# Patient Record
Sex: MALE | Race: WHITE | NOT HISPANIC OR LATINO | URBAN - METROPOLITAN AREA
[De-identification: names, ages, dates, MRNs, and addresses within clinical notes are randomized per-mention and may not be internally consistent; named-entity substitution may affect disease eponyms.]

---

## 2019-06-20 ENCOUNTER — EMERGENCY (EMERGENCY)
Facility: HOSPITAL | Age: 59
LOS: 0 days | Discharge: HOME | End: 2019-06-20
Attending: EMERGENCY MEDICINE | Admitting: EMERGENCY MEDICINE
Payer: COMMERCIAL

## 2019-06-20 VITALS
DIASTOLIC BLOOD PRESSURE: 87 MMHG | HEART RATE: 59 BPM | OXYGEN SATURATION: 99 % | RESPIRATION RATE: 18 BRPM | SYSTOLIC BLOOD PRESSURE: 144 MMHG

## 2019-06-20 VITALS
DIASTOLIC BLOOD PRESSURE: 96 MMHG | OXYGEN SATURATION: 99 % | TEMPERATURE: 96 F | SYSTOLIC BLOOD PRESSURE: 133 MMHG | RESPIRATION RATE: 18 BRPM | HEART RATE: 78 BPM

## 2019-06-20 DIAGNOSIS — R07.89 OTHER CHEST PAIN: ICD-10-CM

## 2019-06-20 DIAGNOSIS — R61 GENERALIZED HYPERHIDROSIS: ICD-10-CM

## 2019-06-20 DIAGNOSIS — R06.00 DYSPNEA, UNSPECIFIED: ICD-10-CM

## 2019-06-20 DIAGNOSIS — R07.9 CHEST PAIN, UNSPECIFIED: ICD-10-CM

## 2019-06-20 DIAGNOSIS — E78.00 PURE HYPERCHOLESTEROLEMIA, UNSPECIFIED: ICD-10-CM

## 2019-06-20 LAB
ALBUMIN SERPL ELPH-MCNC: 4.3 G/DL — SIGNIFICANT CHANGE UP (ref 3.5–5.2)
ALP SERPL-CCNC: 82 U/L — SIGNIFICANT CHANGE UP (ref 30–115)
ALT FLD-CCNC: 35 U/L — SIGNIFICANT CHANGE UP (ref 0–41)
ANION GAP SERPL CALC-SCNC: 11 MMOL/L — SIGNIFICANT CHANGE UP (ref 7–14)
APTT BLD: 35.3 SEC — SIGNIFICANT CHANGE UP (ref 27–39.2)
AST SERPL-CCNC: 48 U/L — HIGH (ref 0–41)
BASOPHILS # BLD AUTO: 0.03 K/UL — SIGNIFICANT CHANGE UP (ref 0–0.2)
BASOPHILS NFR BLD AUTO: 0.7 % — SIGNIFICANT CHANGE UP (ref 0–1)
BILIRUB SERPL-MCNC: 0.7 MG/DL — SIGNIFICANT CHANGE UP (ref 0.2–1.2)
BLD GP AB SCN SERPL QL: SIGNIFICANT CHANGE UP
BUN SERPL-MCNC: 19 MG/DL — SIGNIFICANT CHANGE UP (ref 10–20)
CALCIUM SERPL-MCNC: 9.7 MG/DL — SIGNIFICANT CHANGE UP (ref 8.5–10.1)
CHLORIDE SERPL-SCNC: 104 MMOL/L — SIGNIFICANT CHANGE UP (ref 98–110)
CO2 SERPL-SCNC: 23 MMOL/L — SIGNIFICANT CHANGE UP (ref 17–32)
CREAT SERPL-MCNC: 1 MG/DL — SIGNIFICANT CHANGE UP (ref 0.7–1.5)
EOSINOPHIL # BLD AUTO: 0.06 K/UL — SIGNIFICANT CHANGE UP (ref 0–0.7)
EOSINOPHIL NFR BLD AUTO: 1.4 % — SIGNIFICANT CHANGE UP (ref 0–8)
GLUCOSE SERPL-MCNC: 107 MG/DL — HIGH (ref 70–99)
HCT VFR BLD CALC: 42 % — SIGNIFICANT CHANGE UP (ref 42–52)
HGB BLD-MCNC: 15.3 G/DL — SIGNIFICANT CHANGE UP (ref 14–18)
IMM GRANULOCYTES NFR BLD AUTO: 0.2 % — SIGNIFICANT CHANGE UP (ref 0.1–0.3)
INR BLD: 1.08 RATIO — SIGNIFICANT CHANGE UP (ref 0.65–1.3)
LYMPHOCYTES # BLD AUTO: 1.43 K/UL — SIGNIFICANT CHANGE UP (ref 1.2–3.4)
LYMPHOCYTES # BLD AUTO: 33.3 % — SIGNIFICANT CHANGE UP (ref 20.5–51.1)
MCHC RBC-ENTMCNC: 31 PG — SIGNIFICANT CHANGE UP (ref 27–31)
MCHC RBC-ENTMCNC: 36.4 G/DL — SIGNIFICANT CHANGE UP (ref 32–37)
MCV RBC AUTO: 85.2 FL — SIGNIFICANT CHANGE UP (ref 80–94)
MONOCYTES # BLD AUTO: 0.33 K/UL — SIGNIFICANT CHANGE UP (ref 0.1–0.6)
MONOCYTES NFR BLD AUTO: 7.7 % — SIGNIFICANT CHANGE UP (ref 1.7–9.3)
NEUTROPHILS # BLD AUTO: 2.43 K/UL — SIGNIFICANT CHANGE UP (ref 1.4–6.5)
NEUTROPHILS NFR BLD AUTO: 56.7 % — SIGNIFICANT CHANGE UP (ref 42.2–75.2)
NRBC # BLD: 0 /100 WBCS — SIGNIFICANT CHANGE UP (ref 0–0)
NT-PROBNP SERPL-SCNC: 6 PG/ML — SIGNIFICANT CHANGE UP (ref 0–300)
PLATELET # BLD AUTO: 192 K/UL — SIGNIFICANT CHANGE UP (ref 130–400)
POTASSIUM SERPL-MCNC: 4.5 MMOL/L — SIGNIFICANT CHANGE UP (ref 3.5–5)
POTASSIUM SERPL-SCNC: 4.5 MMOL/L — SIGNIFICANT CHANGE UP (ref 3.5–5)
PROT SERPL-MCNC: 7.1 G/DL — SIGNIFICANT CHANGE UP (ref 6–8)
PROTHROM AB SERPL-ACNC: 12.4 SEC — SIGNIFICANT CHANGE UP (ref 9.95–12.87)
RBC # BLD: 4.93 M/UL — SIGNIFICANT CHANGE UP (ref 4.7–6.1)
RBC # FLD: 11.9 % — SIGNIFICANT CHANGE UP (ref 11.5–14.5)
SODIUM SERPL-SCNC: 138 MMOL/L — SIGNIFICANT CHANGE UP (ref 135–146)
TROPONIN T SERPL-MCNC: <0.01 NG/ML — SIGNIFICANT CHANGE UP
WBC # BLD: 4.29 K/UL — LOW (ref 4.8–10.8)
WBC # FLD AUTO: 4.29 K/UL — LOW (ref 4.8–10.8)

## 2019-06-20 PROCEDURE — 99236 HOSP IP/OBS SAME DATE HI 85: CPT

## 2019-06-20 PROCEDURE — 71046 X-RAY EXAM CHEST 2 VIEWS: CPT | Mod: 26

## 2019-06-20 PROCEDURE — 93010 ELECTROCARDIOGRAM REPORT: CPT | Mod: 76

## 2019-06-20 PROCEDURE — 75574 CT ANGIO HRT W/3D IMAGE: CPT | Mod: 26,59

## 2019-06-20 RX ORDER — ASPIRIN/CALCIUM CARB/MAGNESIUM 324 MG
1 TABLET ORAL
Qty: 30 | Refills: 0
Start: 2019-06-20 | End: 2019-07-19

## 2019-06-20 RX ORDER — ATORVASTATIN CALCIUM 80 MG/1
1 TABLET, FILM COATED ORAL
Qty: 30 | Refills: 0
Start: 2019-06-20 | End: 2019-07-19

## 2019-06-20 RX ORDER — METOPROLOL TARTRATE 50 MG
1 TABLET ORAL
Qty: 60 | Refills: 0
Start: 2019-06-20 | End: 2019-07-19

## 2019-06-20 RX ORDER — NITROGLYCERIN 6.5 MG
0.4 CAPSULE, EXTENDED RELEASE ORAL ONCE
Refills: 0 | Status: COMPLETED | OUTPATIENT
Start: 2019-06-20 | End: 2019-06-20

## 2019-06-20 RX ORDER — ASPIRIN/CALCIUM CARB/MAGNESIUM 324 MG
243 TABLET ORAL ONCE
Refills: 0 | Status: COMPLETED | OUTPATIENT
Start: 2019-06-20 | End: 2019-06-20

## 2019-06-20 RX ADMIN — Medication 0.4 MILLIGRAM(S): at 11:42

## 2019-06-20 RX ADMIN — Medication 243 MILLIGRAM(S): at 11:42

## 2019-06-20 NOTE — ED CDU PROVIDER INITIAL DAY NOTE - PHYSICAL EXAMINATION
CONST: Well appearing in NAD  CARD: Normal S1 S2; Normal rate and rhythm  RESP: Equal BS B/L, No wheezes, rhonchi or rales. No distress  GI: Soft, non-tender, non-distended.  MS: Normal ROM in all extremities. No midline spinal tenderness.  SKIN: Warm, dry, no acute rashes. Good turgor  NEURO: A&Ox3, No focal deficits. Strength 5/5 with no sensory deficits. Steady gait

## 2019-06-20 NOTE — CONSULT NOTE ADULT - SUBJECTIVE AND OBJECTIVE BOX
CHIEF COMPLAINT:Patient is a 58y old  Male who presents with a chief complaint of chest pain    HISTORY OF PRESENT ILLNESS:  58y old male presented to the ER with complains of chest pain. The pain began 1 ago, and since then has occurred two times. The pain is retrosternal in location, is described as pressure like, moderate intensity, and does not radiate. The pain occurs at rest, lasts approximately 1-2 hours, and resolves upon rest. Pain is worse upon exertion.  The chest pain is not associated with palpitations, shortness of breath, PABON, diaphoresis, lightheadedness.    PAST MEDICAL & SURGICAL HISTORY:  No pertinent past medical history  No significant past surgical history    FAMILY HISTORY:    Allergies    No Known Allergies    Intolerances    	  Home Medications:    MEDICATIONS  (STANDING):    MEDICATIONS  (PRN):        SOCIAL HISTORY:    [  ] active smoker  [ x ] non smoker  [  ] Etoh  [  ] recreational drugs    REVIEW OF SYSTEMS:  14 point ROS negative except as mentioned above in HPI    PHYSICAL EXAM:  T(C): 35.7 (06-20-19 @ 15:14), Max: 35.7 (06-20-19 @ 09:41)  HR: 59 (06-20-19 @ 19:45) (59 - 78)  BP: 144/87 (06-20-19 @ 19:45) (129/82 - 144/87)  RR: 18 (06-20-19 @ 19:45) (18 - 18)  SpO2: 99% (06-20-19 @ 19:45) (99% - 99%)  Wt(kg): --  I&O's Summary      General Appearance: in NAD	  HEENT: NC, No JVD appreciated  Cardiovascular: Normal S1 S2, No murmurs  Respiratory: cta b/l  Gastrointestinal:  Soft, Non-tender, BS +	  Neurologic: No focal deficits, AAOx3  Extremities: ROM wnl, No clubbing/cyanosis/edema  Skin: No rashes, No ecchymoses, No cyanosis  Vascular: Peripheral pulses palpable 2+ bilaterally    LABS:	 	                        15.3   4.29  )-----------( 192      ( 20 Jun 2019 11:15 )             42.0     06-20    138  |  104  |  19  ----------------------------<  107<H>  4.5   |  23  |  1.0    Ca    9.7      20 Jun 2019 11:15    TPro  7.1  /  Alb  4.3  /  TBili  0.7  /  DBili  x   /  AST  48<H>  /  ALT  35  /  AlkPhos  82  06-20    eGFR if Non African American: 84 mL/min/1.73M2 (06-20-19 @ 11:15)        Serum Pro-Brain Natriuretic Peptide: 6 pg/mL (06-20-19 @ 12:00)      CARDIAC MARKERS:  06-20-19 @ 11:15  TROPONIN-T  <0.01 ng/mL  CKMB  --  CREATINE KINASE  --  	    ECG:  < from: 12 Lead ECG (06.20.19 @ 12:15) >  Diagnosis Line Sinus bradycardia  Poor R wave progression  Abnormal ECG    Confirmed by Black Hassan ([id number]) on 6/20/2019 2:07:17 PM    < end of copied text >  	    PREVIOUS DIAGNOSTIC TESTING:      < from: CT Heart with Coronaries (06.20.19 @ 14:29) >  IMPRESSION:    1.  Mixed predominantly calcified plaque in the proximal to mid LAD, up   to focal 40-45 % luminal narrowing in the mid LAD.    2. Calcified plaque at the distal left main coronary artery, and ostium   of the left circumflex artery, with minimal luminal irregularity.    3. Small noncalcified plaque at the origin of the first obtuse marginal   branch with mild luminal narrowing.    2. Total coronary calcium score is 145. For a 58-year-old man, this   corresponds to 82 percentile.     CAD RAD: 2      CAD-RADS  Reporting and data system   ______________________________________________________________________    CAD-RADS 0  -    No plaque or stenosis                 Documented absence   of CAD  CAD-RADS 1  -    1-24% Minimal stenosis              Minimal   non-obstructive CAD  CAD-RADS 2  -    25 - 49% Mild stenosis                Mild   non-obstructive CAD  CAD-RADS 3  -    50 - 69% stenosis                        Moderate   Stenosis   CAD-RADS 4  -    A. 70 - 99%Stenosis  or             Severe Stenosis                                       B. Left main >50%   CAD-RADS 5  -    100% (total occlusion)                 Total coronary   occlusion   CAD-RADS N  -    Non-diagnostic study                   Obstructive CAD   cannot be excluded  ______________________________________________________________________          LAURA HERNANDES M.D., ATTENDING RADIOLOGIST  This document has been electronically signed. Jun 20 2019  3:25PM    < end of copied text >    [ ] Stress Test:

## 2019-06-20 NOTE — ED CDU PROVIDER INITIAL DAY NOTE - ATTENDING CONTRIBUTION TO CARE
57yo an h/o HLD was placed in CDU for workup of chest pain, exertional, associated with diaphoresis over the past couple of days, relieved with NTG in the ED. Workup in the ED was unremarkable with EKG, labs including cardiac enzymes, CXR. Plan is for serial EKG and enzymes, CCTA, reassess.

## 2019-06-20 NOTE — ED PROVIDER NOTE - NS ED ROS FT
Constitutional: No fever, chills.  Eyes:  No visual changes, eye pain or discharge.  ENMT:  No hearing changes, pain, no sore throat or runny nose, no difficulty swallowing  Cardiac: Substernal CP. PABON. No edema. No chest pain with exertion.  Respiratory:  No cough or respiratory distress. No hemoptysis. No history of asthma or RAD.  GI:  No nausea, vomiting, diarrhea or abdominal pain.  :  No dysuria, frequency or burning.  MS:  No myalgia, muscle weakness, joint pain or back pain.  Neuro:  No headache or weakness.  No LOC.  Skin:  No skin rash.   Endocrine: No history of thyroid disease or diabetes.

## 2019-06-20 NOTE — ED ADULT NURSE NOTE - OBJECTIVE STATEMENT
Patient stated while doing some pluming work yesterday at 12 pm he suddenly felt like someone was stepping on his chest, this pressure continued and is present currently. Sensation worse with activity and associated with SOB, as per patient symptoms improved slightly after taking 81 mg baby aspirin. Patient denies Fevers chills abdominal pain and urinary symptoms

## 2019-06-20 NOTE — ED CDU PROVIDER DISPOSITION NOTE - CLINICAL COURSE
p 57yo an h/o HLD was placed in CDU for workup of chest pain, exertional, associated with diaphoresis over the past couple of days, relieved with NTG in the ED. Workup in the ED was unremarkable with EKG, labs including cardiac enzymes, CXR. Pt was monitored and remained asymptomatic, VS, exam as noted. CCTA revealed CAD-RAD score of 2 with up to 45-50 stenosis noted; cardiology was consulted, rec asa and statin, f/u with Dr Fuentes in office as if sx persist next step would be cardiac cath.

## 2019-06-20 NOTE — ED CDU PROVIDER DISPOSITION NOTE - CARE PROVIDER_API CALL
Felicia Cabezas)  Cardiovascular Disease; Internal Medicine; Interventional Cardiology  [address] [place] [address]  Phone: [phone]  Fax: [phone]  Follow Up Time: 4-6 Days

## 2019-06-20 NOTE — ED ADULT NURSE REASSESSMENT NOTE - NS ED NURSE REASSESS COMMENT FT1
pt received pt A&Ox4 in no acute distress, vss, pt denies pain or discomfort at this time. Ongoing cardiac monitoring maintained. pt updated with the plan of care. safety and comfort measures maintained. will continue with current plan of care,

## 2019-06-20 NOTE — ED CDU PROVIDER INITIAL DAY NOTE - PROGRESS NOTE DETAILS
CCTA resulted 40-45% stenosis, likely medical management, however given symptoms and percentage of stenosis, borderline for cardio consult. As patient does not have an outpatient cardiologist, will obtain consult in ED to ensure f/u. Patient seen by the cards fellow Dr. Meyer, observe overnight, if CP free, can be discharged in the AM. Recommended optimizing medical management Lipitor, Beta Blocker, ASA received signout from Anil Juarez; pt completed ccta - cardiology fellow recommends medical management but pt pending attending cardiologist DR. Cutler to Ok d/c; pt seen by DR. Cutler cardiology; cleared for d/c home; will plan for outpt eval if need; recommend asa,lipitor, no beta-blocker;

## 2019-06-20 NOTE — ED CDU PROVIDER DISPOSITION NOTE - ATTENDING CONTRIBUTION TO CARE
57yo an h/o HLD was placed in CDU for workup of chest pain, exertional, associated with diaphoresis over the past couple of days, relieved with NTG in the ED. Workup in the ED was unremarkable with EKG, labs including cardiac enzymes, CXR. Pt was monitored and remained asymptomatic, VS, exam as noted. CCTA revealed CAD-RAD score of 2 with up to 45-50 stenosis noted; cardiology was consulted, rec asa and statin, f/u with Dr Fuentes in office as if sx persist next step would be cardiac cath.

## 2019-06-20 NOTE — ED PROVIDER NOTE - OBJECTIVE STATEMENT
58M nosigPMH p/w CP. Started yesterday while performing routine housework. Pressure-like, non-radiating. Worse with exertion, a/w PABON. Pt vomited once yesterday. Denies fever, chills, palpitations, abdominal pain, diarrhea, dysuria. Never had this pain before. Never had stress test, cardiac cath. No 1st degree relatives of early cardiac disease. Never smoker.

## 2019-06-20 NOTE — ED ADULT NURSE NOTE - NSIMPLEMENTINTERV_GEN_ALL_ED
Implemented All Universal Safety Interventions:  [place] to call system. Call bell, personal items and telephone within reach. Instruct patient to call for assistance. Room bathroom lighting operational. Non-slip footwear when patient is off stretcher. Physically safe environment: no spills, clutter or unnecessary equipment. Stretcher in lowest position, wheels locked, appropriate side rails in place.

## 2019-06-20 NOTE — ED CDU PROVIDER INITIAL DAY NOTE - NS ED ROS FT
CONST: No fever, chills or bodyaches  CARD: see HPI  RESP: No SOB, cough  GI: No abdominal pain, N/V/D  MS: No joint pain, back pain or extremity pain/injury  SKIN: No rashes  NEURO: No headache, dizziness, paresthesias or LOC

## 2019-06-20 NOTE — ED PROVIDER NOTE - ATTENDING CONTRIBUTION TO CARE
58 y.o. male, PMH fo HLD, c/o chest pressure since yesterday, associated with 1 episode of vomiting. Pain is non-radiating. Took ASA last night but when woke up today continued to have CP, prompting ED visit. The pain occurs at rest, lasts approximately 1-2 hours, and resolves upon rest. Pain is worse upon exertion. On exam, pt in NAD, AAOx3, head NC/AT, CN II-XII intact, lungs CTA B/L, CV S1S2 regular, abdomen soft/NT/ND/(+)BS, ext (-) edema, motor 5/5x4, sensation intact. Will do labs, EKG, CXR, give ASA and transfer to obs.

## 2019-06-20 NOTE — ED PROVIDER NOTE - PHYSICAL EXAMINATION
Constitutional: Well developed, well nourished. NAD. Good general hygiene  Head: Atraumatic.  Eyes: PERRLA. EOMI without discomfort.   ENT: No nasal discharge. Mucous membranes moist.  Neck: Supple. Painless ROM.  Cardiovascular: Regular rhythm. Regular rate. Normal S1 and S2. No murmurs. 2+ pulses in all extremities.   Pulmonary: Normal respiratory rate and effort. Lungs clear to auscultation bilaterally. No wheezing, rales, or rhonchi. Bilateral, equal lung expansion.   Abdominal: Soft. Nondistended. Nontender. No rebound or guarding.   Extremities. Pelvis stable. No lower extremity edema. Symmetric calves.  Skin: No rashes.   Vascular: 2+ pulses in b/l arms and distal legs. <2sec capillary refill.  Neuro: AAOx3. No focal neurological deficits.  Psych: Normal mood. Normal affect.

## 2019-06-20 NOTE — ED CDU PROVIDER INITIAL DAY NOTE - OBJECTIVE STATEMENT
57yo male with PMHx high cholesterol presented to ED c/o mid-sternal chest pressure persistent yesterday, without radiation, without any aggravating factors, mildly relieved by ASA, associated with diaphoresis. Chest pressure fully resolved with NTG given in ED. Patient has not had a cardio workup in past. No smoking hx. Negative FHx CAD. 57yo male with PMHx high cholesterol presented to ED c/o mid-sternal chest pressure persistent yesterday, first noticed while "doing housework", without radiation, without any aggravating factors, mildly relieved by ASA, associated with diaphoresis. Chest pressure fully resolved with NTG given in ED. Patient has not had a cardio workup in past. No smoking hx. Negative FHx CAD.

## 2019-06-20 NOTE — ED PROVIDER NOTE - PROGRESS NOTE DETAILS
58M HLD p/w CP since yday. pressure like, nonradiating, vomited once. Took baby asa this morning and yday. EKG nonischemic. pending labs, xr. Endorsed to [facility] Lo for obs. CT coronaries.

## 2021-04-12 ENCOUNTER — INPATIENT (INPATIENT)
Facility: HOSPITAL | Age: 61
LOS: 1 days | Discharge: HOME | End: 2021-04-14
Attending: INTERNAL MEDICINE | Admitting: INTERNAL MEDICINE
Payer: COMMERCIAL

## 2021-04-12 VITALS
SYSTOLIC BLOOD PRESSURE: 162 MMHG | TEMPERATURE: 97 F | RESPIRATION RATE: 16 BRPM | WEIGHT: 210.1 LBS | OXYGEN SATURATION: 97 % | DIASTOLIC BLOOD PRESSURE: 94 MMHG | HEIGHT: 68 IN | HEART RATE: 71 BPM

## 2021-04-12 LAB
ALBUMIN SERPL ELPH-MCNC: 4.9 G/DL — SIGNIFICANT CHANGE UP (ref 3.5–5.2)
ALP SERPL-CCNC: 98 U/L — SIGNIFICANT CHANGE UP (ref 30–115)
ALT FLD-CCNC: 28 U/L — SIGNIFICANT CHANGE UP (ref 0–41)
ANION GAP SERPL CALC-SCNC: 12 MMOL/L — SIGNIFICANT CHANGE UP (ref 7–14)
APTT BLD: 34.4 SEC — SIGNIFICANT CHANGE UP (ref 27–39.2)
AST SERPL-CCNC: 44 U/L — HIGH (ref 0–41)
BASOPHILS # BLD AUTO: 0.03 K/UL — SIGNIFICANT CHANGE UP (ref 0–0.2)
BASOPHILS NFR BLD AUTO: 0.4 % — SIGNIFICANT CHANGE UP (ref 0–1)
BILIRUB SERPL-MCNC: 1.1 MG/DL — SIGNIFICANT CHANGE UP (ref 0.2–1.2)
BUN SERPL-MCNC: 12 MG/DL — SIGNIFICANT CHANGE UP (ref 10–20)
CALCIUM SERPL-MCNC: 10 MG/DL — SIGNIFICANT CHANGE UP (ref 8.5–10.1)
CHLORIDE SERPL-SCNC: 102 MMOL/L — SIGNIFICANT CHANGE UP (ref 98–110)
CO2 SERPL-SCNC: 26 MMOL/L — SIGNIFICANT CHANGE UP (ref 17–32)
CREAT SERPL-MCNC: 1 MG/DL — SIGNIFICANT CHANGE UP (ref 0.7–1.5)
EOSINOPHIL # BLD AUTO: 0.12 K/UL — SIGNIFICANT CHANGE UP (ref 0–0.7)
EOSINOPHIL NFR BLD AUTO: 1.7 % — SIGNIFICANT CHANGE UP (ref 0–8)
GLUCOSE SERPL-MCNC: 87 MG/DL — SIGNIFICANT CHANGE UP (ref 70–99)
HCT VFR BLD CALC: 43.4 % — SIGNIFICANT CHANGE UP (ref 42–52)
HGB BLD-MCNC: 15.3 G/DL — SIGNIFICANT CHANGE UP (ref 14–18)
IMM GRANULOCYTES NFR BLD AUTO: 0.3 % — SIGNIFICANT CHANGE UP (ref 0.1–0.3)
INR BLD: 1.11 RATIO — SIGNIFICANT CHANGE UP (ref 0.65–1.3)
LYMPHOCYTES # BLD AUTO: 2.04 K/UL — SIGNIFICANT CHANGE UP (ref 1.2–3.4)
LYMPHOCYTES # BLD AUTO: 28.9 % — SIGNIFICANT CHANGE UP (ref 20.5–51.1)
MCHC RBC-ENTMCNC: 30.6 PG — SIGNIFICANT CHANGE UP (ref 27–31)
MCHC RBC-ENTMCNC: 35.3 G/DL — SIGNIFICANT CHANGE UP (ref 32–37)
MCV RBC AUTO: 86.8 FL — SIGNIFICANT CHANGE UP (ref 80–94)
MONOCYTES # BLD AUTO: 0.4 K/UL — SIGNIFICANT CHANGE UP (ref 0.1–0.6)
MONOCYTES NFR BLD AUTO: 5.7 % — SIGNIFICANT CHANGE UP (ref 1.7–9.3)
NEUTROPHILS # BLD AUTO: 4.44 K/UL — SIGNIFICANT CHANGE UP (ref 1.4–6.5)
NEUTROPHILS NFR BLD AUTO: 63 % — SIGNIFICANT CHANGE UP (ref 42.2–75.2)
NRBC # BLD: 0 /100 WBCS — SIGNIFICANT CHANGE UP (ref 0–0)
NT-PROBNP SERPL-SCNC: 82 PG/ML — SIGNIFICANT CHANGE UP (ref 0–300)
PLATELET # BLD AUTO: 180 K/UL — SIGNIFICANT CHANGE UP (ref 130–400)
POTASSIUM SERPL-MCNC: 4.1 MMOL/L — SIGNIFICANT CHANGE UP (ref 3.5–5)
POTASSIUM SERPL-SCNC: 4.1 MMOL/L — SIGNIFICANT CHANGE UP (ref 3.5–5)
PROT SERPL-MCNC: 7.4 G/DL — SIGNIFICANT CHANGE UP (ref 6–8)
PROTHROM AB SERPL-ACNC: 12.8 SEC — SIGNIFICANT CHANGE UP (ref 9.95–12.87)
RBC # BLD: 5 M/UL — SIGNIFICANT CHANGE UP (ref 4.7–6.1)
RBC # FLD: 12.3 % — SIGNIFICANT CHANGE UP (ref 11.5–14.5)
SODIUM SERPL-SCNC: 140 MMOL/L — SIGNIFICANT CHANGE UP (ref 135–146)
TROPONIN T SERPL-MCNC: <0.01 NG/ML — SIGNIFICANT CHANGE UP
WBC # BLD: 7.05 K/UL — SIGNIFICANT CHANGE UP (ref 4.8–10.8)
WBC # FLD AUTO: 7.05 K/UL — SIGNIFICANT CHANGE UP (ref 4.8–10.8)

## 2021-04-12 PROCEDURE — 93010 ELECTROCARDIOGRAM REPORT: CPT

## 2021-04-12 PROCEDURE — 71046 X-RAY EXAM CHEST 2 VIEWS: CPT | Mod: 26

## 2021-04-12 PROCEDURE — 99218: CPT

## 2021-04-12 RX ORDER — ASPIRIN/CALCIUM CARB/MAGNESIUM 324 MG
325 TABLET ORAL ONCE
Refills: 0 | Status: COMPLETED | OUTPATIENT
Start: 2021-04-12 | End: 2021-04-12

## 2021-04-12 RX ADMIN — Medication 325 MILLIGRAM(S): at 22:49

## 2021-04-12 NOTE — ED PROVIDER NOTE - PROGRESS NOTE DETAILS
TC: 61 yo M with PMHx of anxiety who presents with nonpleuritic nonexertional cp x3-4 days. Had CCTA 2 yrs ago with 40-45% prox/mid LAD stenosis and CAD RADS 2. Here in ED, vitals wnl. Unremarkable physical exam. Ordered labs, ekg, cxr. Will reassess. TC: Labs wnl including negative trop. Ekg nonischemic. Cxr unremarkable. HEART score 2 (1+ age, 1+ risk factors). Given ASA 325mg. Will place in obs for further workup.

## 2021-04-12 NOTE — ED PROVIDER NOTE - OBJECTIVE STATEMENT
61 yo M with PMHx of anxiety who presents with nonradiating, waxing/waning, moderate, midsternal cp x3-4 days associated with dizziness. No alleviating/aggravating factors. No fever, chills, sob, palpitations, leg swelling/pain. Sx feel worse than when he was seen in ED 2 yrs ago and had CCTA 6/20/19 showing 40-45% prox/mid LAD stenosis, calcium score 145, CAD RADS 2. Had f/u stress test that was reportedly normal.    Cardio: Dr. Cabezas 59 yo M with PMHx of anxiety who presents with nonradiating, waxing/waning, moderate, midsternal cp x3-4 days associated with dizziness. No alleviating/aggravating factors. No fever, chills, sob, palpitations, leg swelling/pain. Sx feel worse than when he was seen in ED 2 yrs ago and had CCTA 6/20/19 showing 40-45% prox/mid LAD stenosis, calcium score 145, CAD RADS 2. Had f/u stress test that was reportedly normal. No FHx of CAD.    Cardio: Dr. Cabezas 61 yo M with PMHx of anxiety who presents with nonradiating, waxing/waning, moderate, midsternal cp x3-4 days associated with dizziness. No alleviating/aggravating factors. No fever, chills, sob, palpitations, leg swelling/pain. Sx feel worse than when he was seen in ED 2 yrs ago and had CCTA 6/20/19 showing 40-45% prox/mid LAD stenosis, calcium score 145, CAD RADS 2. Had f/u stress test that was reportedly normal but did not take statins as instructed. No FHx of CAD.    Cardio: Dr. Cabezas

## 2021-04-12 NOTE — ED PROVIDER NOTE - PHYSICAL EXAMINATION
CONSTITUTIONAL: well developed, nontoxic appearing, in no acute distress, speaking in full sentences  SKIN: warm, dry, no rash, cap refill < 2 seconds  HEENT: normocephalic, atraumatic, no conjunctival erythema, moist mucous membranes, patent airway  NECK: supple  CV:  regular rate, regular rhythm, 2+ radial pulses bilaterally  RESP: no wheezes, no rales, no rhonchi, normal work of breathing  ABD: soft, nontender, nondistended, no rebound, no guarding  MSK: normal ROM, no cyanosis, no edema, no calf tenderness  NEURO: alert, oriented, grossly unremarkable  PSYCH: cooperative, appropriate

## 2021-04-12 NOTE — ED ADULT TRIAGE NOTE - CHIEF COMPLAINT QUOTE
"I've been having chest pain since last night. I had a CT done years ago that said I had a blockage in the ."

## 2021-04-12 NOTE — ED PROVIDER NOTE - NS ED ROS FT
GEN:  no fever, no chills, no generalized weakness  NEURO:  no headache, + dizziness  ENT: no sore throat, no runny nose  CV:  + chest pain, no palpitations  RESP:  no sob, no cough  GI:  no nausea, no vomiting, no abdominal pain, no diarrhea  :  no dysuria, no urinary frequency, no hematuria  MSK:  no joint pain, no edema  SKIN:  no rash, no bruising  HEME: no hematochezia, no melena

## 2021-04-12 NOTE — ED PROVIDER NOTE - CLINICAL SUMMARY MEDICAL DECISION MAKING FREE TEXT BOX
61 yo M, hx of anxiety, previous CCTA with CADs-RADs of 2 due to 40-45% LAD stenosis, followed by "normal" stress tet by Dr. Hansen, here for assessment of chst pain. Pain L sided non radiating, associated with dizziness but no associated dyspnea, nausea, diaphoresis.    EKG non ischemic, exam unremarkable. Trop negative x 1.    Given hx of abnormal CCTA, description of pain, will need obs placement for serial trops, tele monitoring and consideration of additional testing/cards eval.

## 2021-04-13 LAB
A1C WITH ESTIMATED AVERAGE GLUCOSE RESULT: 4.9 % — SIGNIFICANT CHANGE UP (ref 4–5.6)
CHOLEST SERPL-MCNC: 228 MG/DL — HIGH
ESTIMATED AVERAGE GLUCOSE: 94 MG/DL — SIGNIFICANT CHANGE UP (ref 68–114)
HDLC SERPL-MCNC: 63 MG/DL — SIGNIFICANT CHANGE UP
LIPID PNL WITH DIRECT LDL SERPL: 162 MG/DL — HIGH
NON HDL CHOLESTEROL: 165 MG/DL — HIGH
RAPID RVP RESULT: SIGNIFICANT CHANGE UP
SARS-COV-2 RNA SPEC QL NAA+PROBE: SIGNIFICANT CHANGE UP
TRIGL SERPL-MCNC: 104 MG/DL — SIGNIFICANT CHANGE UP
TROPONIN T SERPL-MCNC: <0.01 NG/ML — SIGNIFICANT CHANGE UP
TROPONIN T SERPL-MCNC: <0.01 NG/ML — SIGNIFICANT CHANGE UP

## 2021-04-13 PROCEDURE — 93010 ELECTROCARDIOGRAM REPORT: CPT

## 2021-04-13 PROCEDURE — 99217: CPT

## 2021-04-13 PROCEDURE — 93010 ELECTROCARDIOGRAM REPORT: CPT | Mod: 77

## 2021-04-13 RX ORDER — SODIUM CHLORIDE 9 MG/ML
1000 INJECTION INTRAMUSCULAR; INTRAVENOUS; SUBCUTANEOUS
Refills: 0 | Status: DISCONTINUED | OUTPATIENT
Start: 2021-04-13 | End: 2021-04-14

## 2021-04-13 RX ORDER — ATORVASTATIN CALCIUM 80 MG/1
20 TABLET, FILM COATED ORAL AT BEDTIME
Refills: 0 | Status: DISCONTINUED | OUTPATIENT
Start: 2021-04-13 | End: 2021-04-14

## 2021-04-13 RX ORDER — ASPIRIN/CALCIUM CARB/MAGNESIUM 324 MG
81 TABLET ORAL DAILY
Refills: 0 | Status: DISCONTINUED | OUTPATIENT
Start: 2021-04-13 | End: 2021-04-14

## 2021-04-13 RX ORDER — SODIUM CHLORIDE 9 MG/ML
450 INJECTION INTRAMUSCULAR; INTRAVENOUS; SUBCUTANEOUS
Refills: 0 | Status: DISCONTINUED | OUTPATIENT
Start: 2021-04-13 | End: 2021-04-14

## 2021-04-13 RX ORDER — ATROPINE SULFATE 0.1 MG/ML
1 SYRINGE (ML) INJECTION ONCE
Refills: 0 | Status: COMPLETED | OUTPATIENT
Start: 2021-04-13 | End: 2021-04-13

## 2021-04-13 RX ORDER — MORPHINE SULFATE 50 MG/1
4 CAPSULE, EXTENDED RELEASE ORAL ONCE
Refills: 0 | Status: DISCONTINUED | OUTPATIENT
Start: 2021-04-13 | End: 2021-04-13

## 2021-04-13 RX ORDER — PHENYLEPHRINE HYDROCHLORIDE 10 MG/ML
100 INJECTION INTRAVENOUS ONCE
Refills: 0 | Status: COMPLETED | OUTPATIENT
Start: 2021-04-13 | End: 2021-04-13

## 2021-04-13 RX ORDER — NITROGLYCERIN 6.5 MG
0.4 CAPSULE, EXTENDED RELEASE ORAL ONCE
Refills: 0 | Status: COMPLETED | OUTPATIENT
Start: 2021-04-13 | End: 2021-04-13

## 2021-04-13 RX ORDER — CHLORHEXIDINE GLUCONATE 213 G/1000ML
1 SOLUTION TOPICAL
Refills: 0 | Status: DISCONTINUED | OUTPATIENT
Start: 2021-04-13 | End: 2021-04-14

## 2021-04-13 RX ADMIN — PHENYLEPHRINE HYDROCHLORIDE 100 MICROGRAM(S): 10 INJECTION INTRAVENOUS at 21:15

## 2021-04-13 RX ADMIN — SODIUM CHLORIDE 100 MILLILITER(S): 9 INJECTION INTRAMUSCULAR; INTRAVENOUS; SUBCUTANEOUS at 16:47

## 2021-04-13 RX ADMIN — Medication 0.4 MILLIGRAM(S): at 08:08

## 2021-04-13 RX ADMIN — Medication 10 MILLIGRAM(S): at 13:46

## 2021-04-13 RX ADMIN — Medication 81 MILLIGRAM(S): at 13:45

## 2021-04-13 RX ADMIN — ATORVASTATIN CALCIUM 20 MILLIGRAM(S): 80 TABLET, FILM COATED ORAL at 22:28

## 2021-04-13 RX ADMIN — MORPHINE SULFATE 4 MILLIGRAM(S): 50 CAPSULE, EXTENDED RELEASE ORAL at 02:04

## 2021-04-13 RX ADMIN — Medication 1 MILLIGRAM(S): at 21:15

## 2021-04-13 NOTE — H&P ADULT - NSICDXFAMILYHX_GEN_ALL_CORE_FT
FAMILY HISTORY:  Father  Still living? Unknown  FH: diabetes mellitus, Age at diagnosis: Age Unknown  FH: hypertension, Age at diagnosis: Age Unknown    Mother  Still living? Unknown  FH: diabetes mellitus, Age at diagnosis: Age Unknown

## 2021-04-13 NOTE — ED ADULT NURSE REASSESSMENT NOTE - NS ED NURSE REASSESS COMMENT FT1
Pt reassessed, A/Ox3, VSS, pending CCTA, no signs of acute distress noted, pt cardiac monitoring in place, will continue to monitor, comfort and safety maintained.

## 2021-04-13 NOTE — ED CDU PROVIDER INITIAL DAY NOTE - MEDICAL DECISION MAKING DETAILS
Patient to remain on continuous telemetry.  For repeat cardiac enzymes and repeat EKG.  Patient given SL NTG for chest pain.  Old chart reviewed and patient noted to have had CCTA 6/20/19 at [facility] showing a CAD RAD 2 with 40-45% focal mid LAD lesion.  Will contact Cardiology for input as to next diagnostic step.

## 2021-04-13 NOTE — H&P ADULT - HISTORY OF PRESENT ILLNESS
Patient is a 60 year old male with PMHx of anxiety who presented due to chest pain of 3-4 days duration. Patient reports the pain has been 9-10/10 in intensity, midsternal, "feels like someone is stepping on my chest" pressure- like, non radiating, waxing and waning chest pain associated with lightheadedness. Patient reports has tried taking alaeve/ motrin/ aspirin without relief. Reports worse at nights, has woken patient up from sleep two days ago.     Review of systems negative for palpitations, syncope, dyspnea, lower extremity edema, abdominal pain, nausea/vomiting, fever/chills.      Of note, patient was seen in ED for similar pain 2 years ago, reports pain is worse now. Had CCTA 6/20/19 which showed 40-45% prox/mid LAD stenosis, calcium score 145, CAD RADS 2. Patient had a f/u stress test that was reportedly normal but reports he has not been taking statin as instructed.     ED course: Vitals- /94, HR 71, Temp 97 F, RR 16, O2 sat 97% on RA. Labs WNL, Trops negative x2, BNP 82, EKG wnl, CXR unremarkable. S/p ASA 325mg, morphine 4mg x1, Nitroglycerin x1. Evaluated by cardio, w/ plan for cardiac cath today.

## 2021-04-13 NOTE — CONSULT NOTE ADULT - ASSESSMENT
Unstable angina with abnormal CCTA  - Will plan for card cath today  - Please keep NPO  - Continue ASA and Statin  - Will follow

## 2021-04-13 NOTE — CHART NOTE - NSCHARTNOTEFT_GEN_A_CORE
PRE-OP DIAGNOSIS: CP , abnormal CCTA    PROCEDURE:[  x] LHC                         [ x ] Coronary angiography                         [  ] RHC  Physician: Dr Cabezas  Assistant: Óscar    ANESTHESIA TYPE:  [  ]General Anesthesia  [ x ] Sedation  [  ] Local/Regional    ESTIMATED BLOOD LOSS:    10   mL    CONDITION  [  ] Critical  [  ] Serious  [  ]Fair  [ x ]Good    IV CONTRAST:      45 mL    FINDINGS  Left Heart Catheterization:  LVEF%: 60  LVEDP: nl  [ ] Normal Coronary Arteries  [ ] Luminal Irregularities  [ ] Non-obstructive CAD    ACCESS:    [ x] right radial artery  [ ] right femoral artery    HEMOSTASIS:    [x ] D-Stat  [ ] Perclose  [ ] Manual compression    LEFT HEART CATHETERIZATION                                    Left main:  no disease  LAD:  mild disease  Left Circumflex: no disease  Right Coronary Artery: mild irregularities    SYNTAX I/II SCORE:    INTERVENTION  IMPLANTS: n/a    APPROPRIATE USE CRITERIA (AUC):    SPECIMEN REMOVED: N/A    POST-OP DIAGNOSIS  non obstructive CAD.        PLAN OF CARE  can d/c home from cardiac stand point ,after removing the D-stat  pt should be on statin  f/u in the office with Dr Cabezas

## 2021-04-13 NOTE — H&P ADULT - ASSESSMENT
60 year old male with PMHx of anxiety who presented due to pressure like chest pain of 3-4 days duration. Relieved by nitroglycerin and morphine in ED. Trops negative x2, EKG w/o acute changes.     #Chest pain  - Likely unstable angina/ACS vs pleuritic vs MSK   - CXR unremarkable  - CCTA 2019 w/ calcified plaque in LAD 40-45% stenosis, CAD-RAD 2, calcium score 145   - Trops negative x2  - f/u repeat trops  - Serial EKGs    - tele monitoring  - plan for cath today  - NPO for cath   - started ASA 81 mg po qd, Lipitor 20mg po qd (ptnt hasn't been taking this at home)   - f/u lipid panel  - f/u A1c     #Anxiety  - c/w home Paxil 10mg qd     #Obesity   - BMI 32    DVT ppx: Resume post cath  Diet: NPO for procedure, start DASH diet post procedure   Activity: IAT

## 2021-04-13 NOTE — H&P ADULT - NSHPLABSRESULTS_GEN_ALL_CORE
15.3   7.05  )-----------( 180      ( 12 Apr 2021 21:29 )             43.4       04-12    140  |  102  |  12  ----------------------------<  87  4.1   |  26  |  1.0    Ca    10.0      12 Apr 2021 21:29    TPro  7.4  /  Alb  4.9  /  TBili  1.1  /  DBili  x   /  AST  44<H>  /  ALT  28  /  AlkPhos  98  04-12          PT/INR - ( 12 Apr 2021 21:29 )   PT: 12.80 sec;   INR: 1.11 ratio         PTT - ( 12 Apr 2021 21:29 )  PTT:34.4 sec      CARDIAC MARKERS ( 13 Apr 2021 02:00 )  x     / <0.01 ng/mL / x     / x     / x      CARDIAC MARKERS ( 12 Apr 2021 21:29 )  x     / <0.01 ng/mL / x     / x     / x

## 2021-04-13 NOTE — CONSULT NOTE ADULT - SUBJECTIVE AND OBJECTIVE BOX
Patient is a 60y old  Male who presents with a chief complaint of Chest pain (13 Apr 2021 10:17)      HPI:  Patient is a 60 year old male presenting with chest pain pressure- like, non radiating, waxing and waning chest pain associated with lightheadedness. Reports worse at nights, has woken patient up from sleep two days ago. CCTA showed calcified LAD plaque.      PAST MEDICAL & SURGICAL HISTORY:  Anxiety    No significant past surgical history        PREVIOUS DIAGNOSTIC TESTING:      ECHO  FINDINGS:    STRESS  FINDINGS:    CATHETERIZATION  FINDINGS:    MEDICATIONS  (STANDING):  aspirin  chewable 81 milliGRAM(s) Oral daily  atorvastatin 20 milliGRAM(s) Oral at bedtime  chlorhexidine 4% Liquid 1 Application(s) Topical <User Schedule>  PARoxetine 10 milliGRAM(s) Oral daily  sodium chloride 0.9%. 1000 milliLiter(s) (100 mL/Hr) IV Continuous <Continuous>  sodium chloride 0.9%. 450 milliLiter(s) (150 mL/Hr) IV Continuous <Continuous>    MEDICATIONS  (PRN):      FAMILY HISTORY:  FH: diabetes mellitus (Father, Mother)    FH: hypertension (Father)        SOCIAL HISTORY:  CIGARETTES:    ALCOHOL:    REVIEW OF SYSTEMS:  CONSTITUTIONAL: No fever, weight loss, or fatigue  NECK: No pain or stiffness  RESPIRATORY: No cough, wheezing, chills or hemoptysis; No shortness of breath  CARDIOVASCULAR:  chest pain and dizziness as above   GASTROINTESTINAL: No abdominal or epigastric pain. No nausea, vomiting, or hematemesis; No diarrhea or constipation. No melena or hematochezia.  GENITOURINARY: No dysuria, frequency, hematuria, or incontinence  NEUROLOGICAL: No headaches, memory loss, loss of strength, numbness, or tremors  SKIN: No itching, burning, rashes, or lesions   ENDOCRINE: No heat or cold intolerance; No hair loss  MUSCULOSKELETAL: No joint pain or swelling; No muscle, back, or extremity pain          Vital Signs Last 24 Hrs  T(C): 36.4 (13 Apr 2021 22:10), Max: 36.7 (13 Apr 2021 08:06)  T(F): 97.6 (13 Apr 2021 22:10), Max: 98 (13 Apr 2021 08:06)  HR: 62 (13 Apr 2021 22:10) (51 - 67)  BP: 118/76 (13 Apr 2021 22:10) (118/76 - 157/87)  BP(mean): 112 (13 Apr 2021 08:06) (112 - 112)  RR: 18 (13 Apr 2021 22:10) (16 - 18)  SpO2: 99% (13 Apr 2021 08:06) (98% - 99%)        PHYSICAL EXAM:  GENERAL: NAD  HEAD:  Atraumatic, Normocephalic  NECK: Supple, No JVD, Normal thyroid  NERVOUS SYSTEM:  Alert & Oriented X3, Good concentration  CHEST/LUNG: Clear to percussion bilaterally; No rales, rhonchi, wheezing, or rubs  HEART: Regular rate and rhythm; No murmurs, rubs, or gallops  ABDOMEN: Soft, Nontender, Nondistended; Bowel sounds present  EXTREMITIES:  2+ Peripheral Pulses, No clubbing, cyanosis, or edema  SKIN: No rashes or lesions    INTERPRETATION OF TELEMETRY:    ECG:    I&O's Detail      LABS:                        15.3   7.05  )-----------( 180      ( 12 Apr 2021 21:29 )             43.4     04-12    140  |  102  |  12  ----------------------------<  87  4.1   |  26  |  1.0    Ca    10.0      12 Apr 2021 21:29    TPro  7.4  /  Alb  4.9  /  TBili  1.1  /  DBili  x   /  AST  44<H>  /  ALT  28  /  AlkPhos  98  04-12    CARDIAC MARKERS ( 13 Apr 2021 11:32 )  x     / <0.01 ng/mL / x     / x     / x      CARDIAC MARKERS ( 13 Apr 2021 02:00 )  x     / <0.01 ng/mL / x     / x     / x      CARDIAC MARKERS ( 12 Apr 2021 21:29 )  x     / <0.01 ng/mL / x     / x     / x          PT/INR - ( 12 Apr 2021 21:29 )   PT: 12.80 sec;   INR: 1.11 ratio         PTT - ( 12 Apr 2021 21:29 )  PTT:34.4 sec    I&O's Summary      RADIOLOGY & ADDITIONAL STUDIES:

## 2021-04-13 NOTE — ED CDU PROVIDER DISPOSITION NOTE - CLINICAL COURSE
No events on telemetry.  Troponin negative x 2 and non ischemic EKG x 2.  Old record reviewed and case discussed with Dr. Cabezas, Cardiology.  Concern for unstable angina, patient non compliant with meds.  ADMIT for Cardiac Cath.

## 2021-04-13 NOTE — ED CDU PROVIDER INITIAL DAY NOTE - OBJECTIVE STATEMENT
61 yo M with PMHx of anxiety who presents with nonradiating, waxing/waning, moderate, midsternal cp x3-4 days associated with dizziness. No alleviating/aggravating factors. No fever, chills, sob, palpitations, leg swelling/pain. Sx feel worse than when he was seen in ED 2 yrs ago and had CCTA 6/20/19 showing 40-45% prox/mid LAD stenosis, calcium score 145, CAD RADS 2. Had f/u stress test that was reportedly normal but did not take statins as instructed. No FHx of CAD

## 2021-04-13 NOTE — H&P ADULT - NSHPPHYSICALEXAM_GEN_ALL_CORE
PHYSICAL EXAM:  GENERAL: NAD, lying in bed, pleasant   HEAD:  Atraumatic, Normocephalic  EYES: EOMI, PERRL   CHEST/LUNG: Clear to auscultation bilaterally. Mild pain on sternal palpation.   HEART: Bradycardic. S1 and S2 present   ABDOMEN: Soft, Nontender, Nondistended; Bowel sounds present   EXTREMITIES:  2+ Peripheral Pulses, No edema  PSYCH: AAOx3  NEUROLOGY: non-focal  SKIN: No rashes or lesions

## 2021-04-13 NOTE — H&P ADULT - ATTENDING COMMENTS
61 yo man with ANNY- presented with CP    Underwent cath    Spoke with ERNESTINE    Pt seen and examined in F9 area- ambulating; doing well    R wrist bandaged; no other complaints    Cath report reviewed    CAD meds- antiplatelets; statin, B blocker    pt aware no smoking of any kind    DC today if cleared by cardio for close outpt f/u

## 2021-04-14 VITALS
HEART RATE: 55 BPM | RESPIRATION RATE: 18 BRPM | WEIGHT: 219.36 LBS | DIASTOLIC BLOOD PRESSURE: 66 MMHG | TEMPERATURE: 97 F | OXYGEN SATURATION: 96 % | SYSTOLIC BLOOD PRESSURE: 106 MMHG

## 2021-04-14 LAB
ALBUMIN SERPL ELPH-MCNC: 4 G/DL — SIGNIFICANT CHANGE UP (ref 3.5–5.2)
ALP SERPL-CCNC: 78 U/L — SIGNIFICANT CHANGE UP (ref 30–115)
ALT FLD-CCNC: 19 U/L — SIGNIFICANT CHANGE UP (ref 0–41)
ANION GAP SERPL CALC-SCNC: 11 MMOL/L — SIGNIFICANT CHANGE UP (ref 7–14)
AST SERPL-CCNC: 24 U/L — SIGNIFICANT CHANGE UP (ref 0–41)
BILIRUB SERPL-MCNC: 1.1 MG/DL — SIGNIFICANT CHANGE UP (ref 0.2–1.2)
BUN SERPL-MCNC: 14 MG/DL — SIGNIFICANT CHANGE UP (ref 10–20)
CALCIUM SERPL-MCNC: 9.1 MG/DL — SIGNIFICANT CHANGE UP (ref 8.5–10.1)
CHLORIDE SERPL-SCNC: 105 MMOL/L — SIGNIFICANT CHANGE UP (ref 98–110)
CO2 SERPL-SCNC: 24 MMOL/L — SIGNIFICANT CHANGE UP (ref 17–32)
COVID-19 NUCLEOCAPSID GAM AB INTERP: POSITIVE
COVID-19 NUCLEOCAPSID TOTAL GAM ANTIBODY RESULT: 238 INDEX — HIGH
CREAT SERPL-MCNC: 1 MG/DL — SIGNIFICANT CHANGE UP (ref 0.7–1.5)
GLUCOSE SERPL-MCNC: 118 MG/DL — HIGH (ref 70–99)
HCT VFR BLD CALC: 41.7 % — LOW (ref 42–52)
HCV AB S/CO SERPL IA: 0.04 COI — SIGNIFICANT CHANGE UP
HCV AB SERPL-IMP: SIGNIFICANT CHANGE UP
HGB BLD-MCNC: 14.5 G/DL — SIGNIFICANT CHANGE UP (ref 14–18)
MAGNESIUM SERPL-MCNC: 2.1 MG/DL — SIGNIFICANT CHANGE UP (ref 1.8–2.4)
MCHC RBC-ENTMCNC: 31 PG — SIGNIFICANT CHANGE UP (ref 27–31)
MCHC RBC-ENTMCNC: 34.8 G/DL — SIGNIFICANT CHANGE UP (ref 32–37)
MCV RBC AUTO: 89.1 FL — SIGNIFICANT CHANGE UP (ref 80–94)
NRBC # BLD: 0 /100 WBCS — SIGNIFICANT CHANGE UP (ref 0–0)
PLATELET # BLD AUTO: 157 K/UL — SIGNIFICANT CHANGE UP (ref 130–400)
POTASSIUM SERPL-MCNC: 4 MMOL/L — SIGNIFICANT CHANGE UP (ref 3.5–5)
POTASSIUM SERPL-SCNC: 4 MMOL/L — SIGNIFICANT CHANGE UP (ref 3.5–5)
PROT SERPL-MCNC: 6.4 G/DL — SIGNIFICANT CHANGE UP (ref 6–8)
RBC # BLD: 4.68 M/UL — LOW (ref 4.7–6.1)
RBC # FLD: 12.3 % — SIGNIFICANT CHANGE UP (ref 11.5–14.5)
SARS-COV-2 IGG+IGM SERPL QL IA: 238 INDEX — HIGH
SARS-COV-2 IGG+IGM SERPL QL IA: POSITIVE
SODIUM SERPL-SCNC: 140 MMOL/L — SIGNIFICANT CHANGE UP (ref 135–146)
WBC # BLD: 5.38 K/UL — SIGNIFICANT CHANGE UP (ref 4.8–10.8)
WBC # FLD AUTO: 5.38 K/UL — SIGNIFICANT CHANGE UP (ref 4.8–10.8)

## 2021-04-14 RX ORDER — ATORVASTATIN CALCIUM 80 MG/1
1 TABLET, FILM COATED ORAL
Qty: 30 | Refills: 0
Start: 2021-04-14 | End: 2021-05-13

## 2021-04-14 RX ORDER — ATORVASTATIN CALCIUM 80 MG/1
1 TABLET, FILM COATED ORAL
Qty: 0 | Refills: 0 | DISCHARGE
Start: 2021-04-14

## 2021-04-14 RX ORDER — ASPIRIN/CALCIUM CARB/MAGNESIUM 324 MG
1 TABLET ORAL
Qty: 0 | Refills: 0 | DISCHARGE
Start: 2021-04-14

## 2021-04-14 RX ORDER — ASPIRIN/CALCIUM CARB/MAGNESIUM 324 MG
1 TABLET ORAL
Qty: 30 | Refills: 0
Start: 2021-04-14 | End: 2021-05-13

## 2021-04-14 RX ADMIN — Medication 10 MILLIGRAM(S): at 11:08

## 2021-04-14 RX ADMIN — Medication 81 MILLIGRAM(S): at 11:08

## 2021-04-14 NOTE — DISCHARGE NOTE NURSING/CASE MANAGEMENT/SOCIAL WORK - PATIENT PORTAL LINK FT
You can access the FollowMyHealth Patient Portal offered by [facility] by registering at the following website: [email or web address]. By joining [facility]’s FollowMyHealth portal, you will also be able to view your health information using other applications (apps) compatible with our system.

## 2021-04-14 NOTE — DISCHARGE NOTE PROVIDER - CARE PROVIDER_API CALL
Felicia Cabezas)  Cardiovascular Disease; Interventional Cardiology  [address] [place] [address]  Phone: [phone]  Fax: [phone]  Follow Up Time: 1 week

## 2021-04-14 NOTE — DISCHARGE NOTE PROVIDER - NSDCMRMEDTOKEN_GEN_ALL_CORE_FT
aspirin 81 mg oral tablet, chewable: 1 tab(s) orally once a day  atorvastatin 20 mg oral tablet: 1 tab(s) orally once a day (at bedtime)  Paxil 10 mg oral tablet: 1 tab(s) orally once a day

## 2021-04-14 NOTE — DISCHARGE NOTE PROVIDER - NSDCCPCAREPLAN_GEN_ALL_CORE_FT
PRINCIPAL DISCHARGE DIAGNOSIS  Diagnosis: Chest pain  Assessment and Plan of Treatment: You were admitted because you had chest pain.  Your EKG and cardaic enzymes were unremarkable.  We did a cardaic catherization which did not show any obstruction in your coronary arteries (the arteries supplying your heart). Please continue to take aspirin and lipitor.  Discuss with your cardiologist when to take metoprolol as your HR was in the 40s today.  Please follow up with your cardiologist.

## 2021-04-14 NOTE — DISCHARGE NOTE PROVIDER - HOSPITAL COURSE
Patient is a 60 year old male with PMHx of anxiety who presented due to chest pain of 3-4 days duration. Patient reports the pain has been 9-10/10 in intensity, midsternal, "feels like someone is stepping on my chest" pressure- like, non radiating, waxing and waning chest pain associated with lightheadedness. Patient reports has tried taking alaeve/ motrin/ aspirin without relief. Reports worse at nights, has woken patient up from sleep two days ago.     Review of systems negative for palpitations, syncope, dyspnea, lower extremity edema, abdominal pain, nausea/vomiting, fever/chills.      Of note, patient was seen in ED for similar pain 2 years ago, reports pain is worse now. Had CCTA 6/20/19 which showed 40-45% prox/mid LAD stenosis, calcium score 145, CAD RADS 2. Patient had a f/u stress test that was reportedly normal but reports he has not been taking statin as instructed.     ED course: Vitals- /94, HR 71, Temp 97 F, RR 16, O2 sat 97% on RA. Labs WNL, Trops negative x2, BNP 82, EKG wnl, CXR unremarkable. S/p ASA 325mg, morphine 4mg x1, Nitroglycerin x1. Evaluated by cardio, w/ plan for cardiac cath today.       Cardiac cath demonstrated non-obstructive CAD.  Patient will be discharged with ASA and Statin upon discharge and will follow up with his cardiologist today.

## 2021-04-15 LAB
COVID-19 SPIKE DOMAIN AB INTERP: POSITIVE
COVID-19 SPIKE DOMAIN ANTIBODY RESULT: 189 U/ML — HIGH
SARS-COV-2 IGG+IGM SERPL QL IA: 189 U/ML — HIGH
SARS-COV-2 IGG+IGM SERPL QL IA: POSITIVE

## 2021-04-19 DIAGNOSIS — I25.110 ATHEROSCLEROTIC HEART DISEASE OF NATIVE CORONARY ARTERY WITH UNSTABLE ANGINA PECTORIS: ICD-10-CM

## 2021-04-19 DIAGNOSIS — F41.9 ANXIETY DISORDER, UNSPECIFIED: ICD-10-CM

## 2021-04-19 DIAGNOSIS — R07.9 CHEST PAIN, UNSPECIFIED: ICD-10-CM

## 2021-04-19 DIAGNOSIS — Z79.82 LONG TERM (CURRENT) USE OF ASPIRIN: ICD-10-CM

## 2021-04-19 DIAGNOSIS — E66.9 OBESITY, UNSPECIFIED: ICD-10-CM

## 2022-11-21 NOTE — PATIENT PROFILE ADULT - TOBACCO USE
Additional Information: (Optional): The wound was cleaned, and a pressure dressing was applied.  The patient received detailed post-op instructions. Never smoker

## 2023-04-19 NOTE — CONSULT NOTE ADULT - ASSESSMENT
# CP/Angina  # HLD    - c/w telemetry monitoring for 24 h  - CTCA reviewed  - check TTE to assess LVEF and possible WM abnormalities  - f/u fasting lipid panel and HbA1C to assess ASCVD risk  - start on lopressor 25 mg, po q 12 h, lipitor 80 mg po q 24h, ASA 81 mg, po q 24 h  - f/u as OP in 3-4 weeks to assess further medical plan # CP/Angina  # HLD    - c/w telemetry monitoring   - CTCA reviewed  - check TTE to assess LVEF and possible WM abnormalities  - f/u fasting lipid panel and HbA1C to assess ASCVD risk  - lipitor 80 mg po q 24h, ASA 81 mg, po q 24 h  - f/u next Wednesday for a stress echo  - If recurrent symptoms then cardiac cath tomorrow English

## 2024-01-05 NOTE — ED PROVIDER NOTE - INTERPRETATION
Problem: Adult Inpatient Plan of Care  Goal: Plan of Care Review  Outcome: Ongoing, Progressing  Goal: Patient-Specific Goal (Individualized)  Outcome: Ongoing, Progressing  Goal: Absence of Hospital-Acquired Illness or Injury  Outcome: Ongoing, Progressing  Goal: Optimal Comfort and Wellbeing  Outcome: Ongoing, Progressing  Goal: Readiness for Transition of Care  Outcome: Ongoing, Progressing     Problem: Diabetes Comorbidity  Goal: Blood Glucose Level Within Targeted Range  Outcome: Ongoing, Progressing     Problem: Skin Injury Risk Increased  Goal: Skin Health and Integrity  Outcome: Ongoing, Progressing     Problem: Fall Injury Risk  Goal: Absence of Fall and Fall-Related Injury  Outcome: Ongoing, Progressing     Problem: Impaired Wound Healing  Goal: Optimal Wound Healing  Outcome: Ongoing, Progressing     Problem: Infection  Goal: Absence of Infection Signs and Symptoms  Outcome: Ongoing, Progressing     Problem: Adjustment to Illness (Sepsis/Septic Shock)  Goal: Optimal Coping  Outcome: Ongoing, Progressing     Problem: Bleeding (Sepsis/Septic Shock)  Goal: Absence of Bleeding  Outcome: Ongoing, Progressing     Problem: Glycemic Control Impaired (Sepsis/Septic Shock)  Goal: Blood Glucose Level Within Desired Range  Outcome: Ongoing, Progressing     Problem: Infection Progression (Sepsis/Septic Shock)  Goal: Absence of Infection Signs and Symptoms  Outcome: Ongoing, Progressing     Problem: Nutrition Impaired (Sepsis/Septic Shock)  Goal: Optimal Nutrition Intake  Outcome: Ongoing, Progressing     Problem: Fluid and Electrolyte Imbalance (Acute Kidney Injury/Impairment)  Goal: Fluid and Electrolyte Balance  Outcome: Ongoing, Progressing     Problem: Oral Intake Inadequate (Acute Kidney Injury/Impairment)  Goal: Optimal Nutrition Intake  Outcome: Ongoing, Progressing     Problem: Renal Function Impairment (Acute Kidney Injury/Impairment)  Goal: Effective Renal Function  Outcome: Ongoing, Progressing      Problem: Bleeding (Surgery Nonspecified)  Goal: Absence of Bleeding  Outcome: Ongoing, Progressing     Problem: Bowel Motility Impaired (Surgery Nonspecified)  Goal: Effective Bowel Elimination  Outcome: Ongoing, Progressing     Problem: Fluid and Electrolyte Imbalance (Surgery Nonspecified)  Goal: Fluid and Electrolyte Balance  Outcome: Ongoing, Progressing     Problem: Glycemic Control Impaired (Surgery Nonspecified)  Goal: Blood Glucose Level Within Targeted Range  Outcome: Ongoing, Progressing     Problem: Pain (Surgery Nonspecified)  Goal: Acceptable Pain Control  Outcome: Ongoing, Progressing     Problem: Hypertension Comorbidity  Goal: Blood Pressure in Desired Range  Outcome: Ongoing, Progressing     Problem: Diabetic Ketoacidosis  Goal: Fluid and Electrolyte Balance with Absence of Ketosis  Outcome: Ongoing, Progressing     Problem: UTI (Urinary Tract Infection)  Goal: Improved Infection Symptoms  Outcome: Ongoing, Progressing     Problem: Adjustment to Illness (Heart Failure)  Goal: Optimal Coping  Outcome: Ongoing, Progressing     Problem: Cardiac Output Decreased (Heart Failure)  Goal: Optimal Cardiac Output  Outcome: Ongoing, Progressing     Problem: Dysrhythmia (Heart Failure)  Goal: Stable Heart Rate and Rhythm  Outcome: Ongoing, Progressing     Problem: Fluid Imbalance (Heart Failure)  Goal: Fluid Balance  Outcome: Ongoing, Progressing     Problem: Functional Ability Impaired (Heart Failure)  Goal: Optimal Functional Ability  Outcome: Ongoing, Progressing     Problem: Oral Intake Inadequate (Heart Failure)  Goal: Optimal Nutrition Intake  Outcome: Ongoing, Progressing     Problem: Respiratory Compromise (Heart Failure)  Goal: Effective Oxygenation and Ventilation  Outcome: Ongoing, Progressing     Problem: Sleep Disordered Breathing (Heart Failure)  Goal: Effective Breathing Pattern During Sleep  Outcome: Ongoing, Progressing      normal

## 2024-04-17 NOTE — DISCHARGE NOTE NURSING/CASE MANAGEMENT/SOCIAL WORK - NURSING SECTION COMPLETE
Patient/Caregiver provided printed discharge information. Show Topical Anesthesia Variable?: Yes Consent: The patient's verbal consent was obtained including but not limited to risks of crusting, scabbing, blistering, scarring, darker or lighter pigmentary change, recurrence, incomplete removal and infection. Medical Necessity Information: It is in your best interest to select a reason for this procedure from the list below. All of these items fulfill various CMS LCD requirements except the new and changing color options. Spray Paint Technique: No Spray Paint Text: The liquid nitrogen was applied to the skin utilizing a spray paint frosting technique. Number Of Freeze-Thaw Cycles: 5 freeze-thaw cycles Post-Care Instructions: I reviewed with the patient in detail post-care instructions. Patient is to wear sunprotection, and avoid picking at any of the treated lesions. Pt may apply Vaseline to crusted or scabbing areas. Detail Level: Simple
